# Patient Record
Sex: FEMALE | Race: BLACK OR AFRICAN AMERICAN | NOT HISPANIC OR LATINO | Employment: OTHER | URBAN - METROPOLITAN AREA
[De-identification: names, ages, dates, MRNs, and addresses within clinical notes are randomized per-mention and may not be internally consistent; named-entity substitution may affect disease eponyms.]

---

## 2023-04-05 ENCOUNTER — OFFICE VISIT (OUTPATIENT)
Dept: PRIMARY CARE | Facility: CLINIC | Age: 48
End: 2023-04-05
Payer: COMMERCIAL

## 2023-04-05 VITALS
HEIGHT: 67 IN | WEIGHT: 173 LBS | TEMPERATURE: 97.1 F | OXYGEN SATURATION: 98 % | SYSTOLIC BLOOD PRESSURE: 125 MMHG | HEART RATE: 77 BPM | BODY MASS INDEX: 27.15 KG/M2 | RESPIRATION RATE: 17 BRPM | DIASTOLIC BLOOD PRESSURE: 81 MMHG

## 2023-04-05 DIAGNOSIS — M46.1 SACROILIITIS (CMS-HCC): ICD-10-CM

## 2023-04-05 DIAGNOSIS — M25.551 HIP PAIN, ACUTE, RIGHT: Primary | ICD-10-CM

## 2023-04-05 PROBLEM — J45.20 MILD INTERMITTENT ASTHMA (HHS-HCC): Status: ACTIVE | Noted: 2023-04-05

## 2023-04-05 PROBLEM — N60.99 ATYPICAL DUCTAL HYPERPLASIA OF BREAST: Status: ACTIVE | Noted: 2022-04-01

## 2023-04-05 PROBLEM — E66.3 OVERWEIGHT (BMI 25.0-29.9): Status: ACTIVE | Noted: 2018-03-02

## 2023-04-05 PROBLEM — E66.3 OVERWEIGHT: Status: ACTIVE | Noted: 2023-04-05

## 2023-04-05 PROBLEM — N90.810: Status: ACTIVE | Noted: 2022-07-14

## 2023-04-05 PROBLEM — N97.9 FEMALE INFERTILITY: Status: ACTIVE | Noted: 2023-04-05

## 2023-04-05 PROBLEM — J45.909 ASTHMA (HHS-HCC): Status: ACTIVE | Noted: 2022-03-24

## 2023-04-05 PROBLEM — R76.8 ANA POSITIVE: Status: ACTIVE | Noted: 2019-08-20

## 2023-04-05 PROCEDURE — 99213 OFFICE O/P EST LOW 20 MIN: CPT | Performed by: FAMILY MEDICINE

## 2023-04-05 RX ORDER — IBUPROFEN 800 MG/1
800 TABLET ORAL EVERY 8 HOURS PRN
COMMUNITY
Start: 2023-03-14

## 2023-04-05 RX ORDER — CLOBETASOL PROPIONATE 0.5 MG/G
OINTMENT TOPICAL
COMMUNITY
Start: 2022-08-04 | End: 2023-04-05 | Stop reason: ALTCHOICE

## 2023-04-05 RX ORDER — HALOBETASOL PROPIONATE 0.5 MG/G
OINTMENT TOPICAL
COMMUNITY
Start: 2023-03-10

## 2023-04-05 RX ORDER — MULTIVITAMIN
TABLET ORAL
COMMUNITY
Start: 2022-02-28

## 2023-04-05 RX ORDER — ESTRADIOL 2 MG/1
3 TABLET ORAL DAILY
COMMUNITY
Start: 2022-07-22 | End: 2023-04-05 | Stop reason: ALTCHOICE

## 2023-04-05 ASSESSMENT — PAIN SCALES - GENERAL: PAINLEVEL: 4

## 2023-04-05 ASSESSMENT — ENCOUNTER SYMPTOMS
FREQUENCY: 0
LOSS OF SENSATION IN FEET: 0
OCCASIONAL FEELINGS OF UNSTEADINESS: 0
WEAKNESS: 0
NUMBNESS: 0
DEPRESSION: 0
FLANK PAIN: 0
DIFFICULTY URINATING: 0

## 2023-04-05 ASSESSMENT — COLUMBIA-SUICIDE SEVERITY RATING SCALE - C-SSRS
1. IN THE PAST MONTH, HAVE YOU WISHED YOU WERE DEAD OR WISHED YOU COULD GO TO SLEEP AND NOT WAKE UP?: NO
6. HAVE YOU EVER DONE ANYTHING, STARTED TO DO ANYTHING, OR PREPARED TO DO ANYTHING TO END YOUR LIFE?: NO
2. HAVE YOU ACTUALLY HAD ANY THOUGHTS OF KILLING YOURSELF?: NO

## 2023-04-05 ASSESSMENT — PATIENT HEALTH QUESTIONNAIRE - PHQ9
2. FEELING DOWN, DEPRESSED OR HOPELESS: NOT AT ALL
SUM OF ALL RESPONSES TO PHQ9 QUESTIONS 1 AND 2: 0
1. LITTLE INTEREST OR PLEASURE IN DOING THINGS: NOT AT ALL

## 2023-04-05 NOTE — PROGRESS NOTES
Subjective   Patient ID: Yahaira Murrell is a 48 y.o. female who presents for Hip Pain (Patient states that she has had right hip pain for about 5-6 days now. Patient states she think she may have been running too fast on the treadmill. She also states that she has these episodes on hip pain.).    Just got back to running on the treadmill. 3 d pain right posterior pelvis area and lateral hip. Was really bad 2 days ago. Took Ibuprofen for it for 2 days. No numbness or tingling. She is concerned because this happens recurrently. Wants to be sure nothing important is going on.     Had other concerns. Recently started with [facility] infertility. Has had vaginal itch and was given clobetasol cream. When she had this before it was yeast infection. This time, got monistat and internal itching cleared up partially. Went back to [facility] and they told her nothing to help her. They did cultures and everything came out clear. She had a little discharge when she had full-blown yeast. Would like to know who could see to evaluate it.            Current Outpatient Medications:     halobetasol (UltraVATE) 0.05 % ointment, APPLY TO AFFECTED AREA TWICE A DAY FOR 7 DAYS THEN TWICE WEEKLY, Disp: , Rfl:     ibuprofen 800 mg tablet, TAKE 1 TABLET BY MOUTH EVERY 8 HOURS FOR PAIN, Disp: , Rfl:     multivitamin tablet, Take by mouth., Disp: , Rfl:     Patient Active Problem List   Diagnosis    Female infertility    Mild intermittent asthma    Overweight (BMI 25.0-29.9)    Atypical ductal hyperplasia of breast    History of female genital mutilation    STAR positive    Asthma    Sacroiliitis (CMS/HCC)    Hip pain, acute, right         Review of Systems   Genitourinary:  Negative for difficulty urinating, flank pain and frequency.   Skin:  Negative for rash.   Neurological:  Negative for weakness and numbness.       Objective   /81 (BP Location: Left arm, Patient Position: Sitting, BP Cuff Size: Adult)   Pulse 77   Temp 36.2 °C (97.1 °F)   Resp  "17   Ht 1.702 m (5' 7\")   Wt 78.5 kg (173 lb)   SpO2 98%   BMI 27.10 kg/m²     Physical Exam  Constitutional:       Appearance: Normal appearance. She is normal weight.   Musculoskeletal:      Cervical back: Neck supple.      Comments: ROM back full. Tender right SI. Normal ROM hip. No tenderness hip. Neg SLR. Normal strength and ROM BLE.    Neurological:      Mental Status: She is alert.         Assessment/Plan   Problem List Items Addressed This Visit          Musculoskeletal    Sacroiliitis (CMS/HCC)     Pain right SI area, difficulty after prolnoged sitting and getting up, or after doing treadmill. Continue Ibuprofen for 7 more days. If not helpful, could consider steroid but she has issues with vaginal itching.   SI and hip xr  can be done if not better. She can go to see orthopedics for evaluation if desires. Feel she is good candidate for [facility] therapy. Will continue Ibuprofen for 7 days straight to limit inflammation.         Relevant Orders    XR sacroiliac joints 3+ views    Referral to Physical Therapy    Hip pain, acute, right - Primary     Recurrent, hard to walk after used treadmill. Will get Xray if not better. Will refer for [facility] therapy. . This is 7th episode in past 3 years.          Relevant Orders    XR hip right 2 or 3 views    Referral to Physical Therapy         Assessment, plans, tests, and follow up discussed with patient and patient verbalized understanding. Yahaira was given an opportunity to ask questions and  any concerns were addressed including but not limited to tratment options, indications for follow up and imaging..  "

## 2023-04-05 NOTE — ASSESSMENT & PLAN NOTE
Pain right SI area, difficulty after prolnoged sitting and getting up, or after doing treadmill. Continue Ibuprofen for 7 more days. If not helpful, could consider steroid but she has issues with vaginal itching.   SI and hip xr  can be done if not better. She can go to see orthopedics for evaluation if desires. Feel she is good candidate for Preeti therapy. Will continue Ibuprofen for 7 days straight to limit inflammation.   Statement Selected

## 2023-04-05 NOTE — PATIENT INSTRUCTIONS
You have referral to [facility]. Can be in either [place] or [place].     If not better, go ahead and get your xrays.     Continue Ibuprofen for 7 days.

## 2023-04-05 NOTE — ASSESSMENT & PLAN NOTE
Recurrent, hard to walk after used treadmill. Will get Xray if not better. Will refer for [facility]. . This is 7th episode in past 3 years.

## 2023-04-19 LAB
CLUE CELLS: NORMAL
NUGENT SCORE: 2
YEAST: NORMAL

## 2023-07-17 ENCOUNTER — OFFICE VISIT (OUTPATIENT)
Dept: PRIMARY CARE | Facility: CLINIC | Age: 48
End: 2023-07-17
Payer: COMMERCIAL

## 2023-07-17 VITALS
TEMPERATURE: 97.3 F | BODY MASS INDEX: 27 KG/M2 | HEART RATE: 78 BPM | WEIGHT: 172 LBS | OXYGEN SATURATION: 98 % | DIASTOLIC BLOOD PRESSURE: 71 MMHG | HEIGHT: 67 IN | SYSTOLIC BLOOD PRESSURE: 107 MMHG

## 2023-07-17 DIAGNOSIS — Z71.84 TRAVEL ADVICE ENCOUNTER: ICD-10-CM

## 2023-07-17 DIAGNOSIS — Z23 NEED FOR VACCINATION: Primary | ICD-10-CM

## 2023-07-17 PROCEDURE — 1036F TOBACCO NON-USER: CPT | Performed by: FAMILY MEDICINE

## 2023-07-17 PROCEDURE — 99213 OFFICE O/P EST LOW 20 MIN: CPT | Performed by: FAMILY MEDICINE

## 2023-07-17 RX ORDER — MEFLOQUINE HYDROCHLORIDE 250 MG/1
250 TABLET ORAL
Qty: 8 TABLET | Refills: 0 | Status: SHIPPED | OUTPATIENT
Start: 2023-07-17 | End: 2023-09-15

## 2023-07-17 RX ORDER — SALMONELLA TYPHI TY2 VI POLYSACCHARIDE ANTIGEN 25 UG/.5ML
0.5 INJECTION, SOLUTION INTRAMUSCULAR ONCE
Qty: 0.5 ML | Refills: 0 | Status: SHIPPED | OUTPATIENT
Start: 2023-07-17 | End: 2023-07-17

## 2023-07-17 NOTE — PROGRESS NOTES
"Subjective   Patient ID: Yahaira Murrell is a 48 y.o. female who presents for Med Refill.    She is traveling to [facility] on August 4. She has interest in getting malaria prophylaxis. She went to travel clinic and Typhoid price was very high.     She is going to [facility]. She will be staying in city the whole time. She has had yellow Fever Vaccien in the past. She needs to have Typhoid.     CDC recommends Tdap as diphtheria outbreak. She declines.            Current Outpatient Medications:     halobetasol (UltraVATE) 0.05 % ointment, APPLY TO AFFECTED AREA TWICE A DAY FOR 7 DAYS THEN TWICE WEEKLY, Disp: , Rfl:     ibuprofen 800 mg tablet, Take 1 tablet (800 mg) by mouth every 8 hours if needed for moderate pain (4 - 6)., Disp: , Rfl:     multivitamin tablet, Take by mouth., Disp: , Rfl:     mefloquine (Lariam) 250 mg tablet, Take 1 tablet (250 mg) by mouth 1 (one) time per week., Disp: 8 tablet, Rfl: 0    typhoid polysaccharide (Typhim VI) 25 mcg/0.5 mL injection, Inject 0.5 mL (25 mcg) into the shoulder, thigh, or buttocks 1 time for 1 dose., Disp: 0.5 mL, Rfl: 0    Patient Active Problem List   Diagnosis    Female infertility    Mild intermittent asthma    Overweight (BMI 25.0-29.9)    Atypical ductal hyperplasia of breast    History of female genital mutilation    STAR positive    Asthma    Sacroiliitis (CMS/HCC)    Hip pain, acute, right    Travel advice encounter         Review of Systems    Objective   /71   Pulse 78   Temp 36.3 °C (97.3 °F)   Ht 1.702 m (5' 7\")   Wt 78 kg (172 lb)   SpO2 98%   BMI 26.94 kg/m²     Physical Exam  Vitals reviewed.   Constitutional:       Appearance: Normal appearance.   Pulmonary:      Effort: Pulmonary effort is normal.   Neurological:      Mental Status: She is alert.   Psychiatric:         Mood and Affect: Mood normal.         Behavior: Behavior normal.         Assessment/Plan   Problem List Items Addressed This Visit       Travel advice encounter     Traveling to " [place]. Reviewed CDC website. Will get typhoid vaccine at pharmacy. Has had yellow fever vaccine and no booster needed. Recommend tdap as putbreak of Diphtheria currently. Mefloquine prophylaxis ordered and explained to patient. Protective measures discussed.           Other Visit Diagnoses       Need for vaccination    -  Primary    Relevant Medications    typhoid polysaccharide (Typhim VI) 25 mcg/0.5 mL injection    mefloquine (Lariam) 250 mg tablet              Assessment, plans, tests, and follow up discussed with patient and patient verbalized understanding. Yahaira was given an opportunity to ask questions and  any concerns were addressed including but not limited to prevention of infectious disease, water safety, CDC recommendations. .

## 2023-07-17 NOTE — ASSESSMENT & PLAN NOTE
Traveling to [facility]. Reviewed CDC website. Will get typhoid vaccine at pharmacy. Has had yellow fever vaccine and no booster needed. Recommend tdap as putbreak of Diphtheria currently. Mefloquine prophylaxis ordered and explained to patient. Protective measures discussed.

## 2023-07-17 NOTE — PATIENT INSTRUCTIONS
Recommend Tdap vaccine be updated to protect against Diphtheria.     You have prescription for Typhoid vaccine. Check with health department to see if you can get it there.     Mefloquine 250 mg one weekly, beginning at least 2 weeks before travel and for 4 full weeks afterward.     Protect yourself from mosqutoes the entire time you are there.

## 2024-01-29 ENCOUNTER — APPOINTMENT (OUTPATIENT)
Dept: PRIMARY CARE | Facility: CLINIC | Age: 49
End: 2024-01-29
Payer: COMMERCIAL

## 2024-04-15 ENCOUNTER — OFFICE VISIT (OUTPATIENT)
Dept: PRIMARY CARE | Facility: CLINIC | Age: 49
End: 2024-04-15
Payer: COMMERCIAL

## 2024-04-15 ENCOUNTER — LAB (OUTPATIENT)
Dept: LAB | Facility: LAB | Age: 49
End: 2024-04-15
Payer: COMMERCIAL

## 2024-04-15 VITALS
SYSTOLIC BLOOD PRESSURE: 120 MMHG | HEIGHT: 67 IN | HEART RATE: 68 BPM | WEIGHT: 172.8 LBS | RESPIRATION RATE: 16 BRPM | TEMPERATURE: 97.3 F | BODY MASS INDEX: 27.12 KG/M2 | DIASTOLIC BLOOD PRESSURE: 76 MMHG | OXYGEN SATURATION: 99 %

## 2024-04-15 DIAGNOSIS — Z11.59 ENCOUNTER FOR HEPATITIS C SCREENING TEST FOR LOW RISK PATIENT: ICD-10-CM

## 2024-04-15 DIAGNOSIS — N60.99 ATYPICAL DUCTAL HYPERPLASIA OF BREAST: ICD-10-CM

## 2024-04-15 DIAGNOSIS — Z13.1 SCREENING FOR DIABETES MELLITUS: ICD-10-CM

## 2024-04-15 DIAGNOSIS — Z12.11 SCREEN FOR COLON CANCER: ICD-10-CM

## 2024-04-15 DIAGNOSIS — Z13.220 SCREENING, LIPID: ICD-10-CM

## 2024-04-15 DIAGNOSIS — M46.1 SACROILIITIS (CMS-HCC): ICD-10-CM

## 2024-04-15 DIAGNOSIS — J45.20 MILD INTERMITTENT ASTHMA WITHOUT COMPLICATION (HHS-HCC): ICD-10-CM

## 2024-04-15 DIAGNOSIS — Z23 NEED FOR VACCINATION: ICD-10-CM

## 2024-04-15 DIAGNOSIS — Z00.00 WELL ADULT EXAM: Primary | ICD-10-CM

## 2024-04-15 PROBLEM — M25.551 HIP PAIN, ACUTE, RIGHT: Status: RESOLVED | Noted: 2023-04-05 | Resolved: 2024-04-15

## 2024-04-15 LAB
ALBUMIN SERPL BCP-MCNC: 4.5 G/DL (ref 3.4–5)
ALP SERPL-CCNC: 42 U/L (ref 33–110)
ALT SERPL W P-5'-P-CCNC: 13 U/L (ref 7–45)
ANION GAP SERPL CALC-SCNC: 10 MMOL/L (ref 10–20)
AST SERPL W P-5'-P-CCNC: 17 U/L (ref 9–39)
BILIRUB SERPL-MCNC: 0.7 MG/DL (ref 0–1.2)
BUN SERPL-MCNC: 13 MG/DL (ref 6–23)
CALCIUM SERPL-MCNC: 9.3 MG/DL (ref 8.6–10.3)
CHLORIDE SERPL-SCNC: 108 MMOL/L (ref 98–107)
CHOLEST SERPL-MCNC: 169 MG/DL (ref 0–199)
CHOLESTEROL/HDL RATIO: 3.2
CO2 SERPL-SCNC: 27 MMOL/L (ref 21–32)
CREAT SERPL-MCNC: 0.92 MG/DL (ref 0.5–1.05)
EGFRCR SERPLBLD CKD-EPI 2021: 77 ML/MIN/1.73M*2
ERYTHROCYTE [DISTWIDTH] IN BLOOD BY AUTOMATED COUNT: 13.1 % (ref 11.5–14.5)
GLUCOSE SERPL-MCNC: 82 MG/DL (ref 74–99)
HCT VFR BLD AUTO: 38.1 % (ref 36–46)
HCV AB SER QL: NONREACTIVE
HDLC SERPL-MCNC: 53 MG/DL
HGB BLD-MCNC: 12.3 G/DL (ref 12–16)
LDLC SERPL CALC-MCNC: 101 MG/DL
MCH RBC QN AUTO: 28.5 PG (ref 26–34)
MCHC RBC AUTO-ENTMCNC: 32.3 G/DL (ref 32–36)
MCV RBC AUTO: 88 FL (ref 80–100)
NON HDL CHOLESTEROL: 116 MG/DL (ref 0–149)
NRBC BLD-RTO: 0 /100 WBCS (ref 0–0)
PLATELET # BLD AUTO: 248 X10*3/UL (ref 150–450)
POTASSIUM SERPL-SCNC: 5.1 MMOL/L (ref 3.5–5.3)
PROT SERPL-MCNC: 7.3 G/DL (ref 6.4–8.2)
RBC # BLD AUTO: 4.32 X10*6/UL (ref 4–5.2)
SODIUM SERPL-SCNC: 140 MMOL/L (ref 136–145)
TRIGL SERPL-MCNC: 73 MG/DL (ref 0–149)
VLDL: 15 MG/DL (ref 0–40)
WBC # BLD AUTO: 2.9 X10*3/UL (ref 4.4–11.3)

## 2024-04-15 PROCEDURE — 99396 PREV VISIT EST AGE 40-64: CPT | Performed by: FAMILY MEDICINE

## 2024-04-15 PROCEDURE — 36415 COLL VENOUS BLD VENIPUNCTURE: CPT

## 2024-04-15 PROCEDURE — 86803 HEPATITIS C AB TEST: CPT

## 2024-04-15 PROCEDURE — 85027 COMPLETE CBC AUTOMATED: CPT

## 2024-04-15 PROCEDURE — 83036 HEMOGLOBIN GLYCOSYLATED A1C: CPT

## 2024-04-15 PROCEDURE — 80053 COMPREHEN METABOLIC PANEL: CPT

## 2024-04-15 PROCEDURE — 80061 LIPID PANEL: CPT

## 2024-04-15 RX ORDER — ESTRADIOL 0.1 MG/G
CREAM VAGINAL
COMMUNITY
Start: 2024-02-08 | End: 2024-04-15 | Stop reason: ALTCHOICE

## 2024-04-15 RX ORDER — ALBUTEROL SULFATE 90 UG/1
2 AEROSOL, METERED RESPIRATORY (INHALATION) EVERY 6 HOURS PRN
Qty: 18 G | Refills: 11 | Status: SHIPPED | OUTPATIENT
Start: 2024-04-15 | End: 2025-04-15

## 2024-04-15 RX ORDER — PROGESTERONE 200 MG/1
CAPSULE ORAL
COMMUNITY
Start: 2023-09-28

## 2024-04-15 RX ORDER — TAMOXIFEN CITRATE 20 MG/1
20 TABLET ORAL
COMMUNITY
Start: 2023-12-28 | End: 2024-04-15 | Stop reason: ALTCHOICE

## 2024-04-15 ASSESSMENT — ENCOUNTER SYMPTOMS
VOMITING: 0
DEPRESSION: 0
COUGH: 0
BLOOD IN STOOL: 0
APPETITE CHANGE: 0
DIARRHEA: 0
ARTHRALGIAS: 0
LOSS OF SENSATION IN FEET: 0
CONSTIPATION: 0
NERVOUS/ANXIOUS: 0
DIFFICULTY URINATING: 0
SHORTNESS OF BREATH: 0
POLYPHAGIA: 0
ACTIVITY CHANGE: 0
PALPITATIONS: 0
CONFUSION: 0
UNEXPECTED WEIGHT CHANGE: 0
TROUBLE SWALLOWING: 0
SEIZURES: 0
DYSPHORIC MOOD: 0
POLYDIPSIA: 0
NAUSEA: 0
JOINT SWELLING: 0
ABDOMINAL PAIN: 0
HEADACHES: 0
OCCASIONAL FEELINGS OF UNSTEADINESS: 0
WHEEZING: 0
FATIGUE: 0

## 2024-04-15 ASSESSMENT — COLUMBIA-SUICIDE SEVERITY RATING SCALE - C-SSRS
6. HAVE YOU EVER DONE ANYTHING, STARTED TO DO ANYTHING, OR PREPARED TO DO ANYTHING TO END YOUR LIFE?: NO
1. IN THE PAST MONTH, HAVE YOU WISHED YOU WERE DEAD OR WISHED YOU COULD GO TO SLEEP AND NOT WAKE UP?: NO
2. HAVE YOU ACTUALLY HAD ANY THOUGHTS OF KILLING YOURSELF?: NO

## 2024-04-15 ASSESSMENT — PATIENT HEALTH QUESTIONNAIRE - PHQ9
2. FEELING DOWN, DEPRESSED OR HOPELESS: NOT AT ALL
1. LITTLE INTEREST OR PLEASURE IN DOING THINGS: NOT AT ALL
SUM OF ALL RESPONSES TO PHQ9 QUESTIONS 1 AND 2: 0

## 2024-04-15 NOTE — PROGRESS NOTES
Subjective   Patient ID: Yahaira Murrell is a 49 y.o. female who presents for Annual Exam (Annual / Physical exam).    Here for physical and follow up.     Due for repeat lipids    Mild Intermittent asthma - rare use of inhaler. No shortness of breath with usual activities.     Being treated by breast surgery for Atypical Ductal Hyperplasia. On Tamoxifen but she was concerned about the side effects. She had surgery and is willing to just watch with MRI every 6 months.  Seen at [facility].     Here for annual wellness exam. Last wellness unknown.     New concerns:no  Feels: fairly well    Changes  to health/medications since last visit No   Other providers seen since last visit Surgery, GYN, wanted her to have screening for diabetes.   Periods: Becoming less regular  Contraception: Had salpingectomy in the past    Healthy lifestyle habits: Regular exercise: Yes, could improve                                        Dietary habits: lot of vegetables and grains                                        Social connections/relationships good                                        Wears seatbelts Yes                                         Sunscreen Yes                                         Sexual Risk Factors No        Health screenings:  Pap smear: July 2022 HPV negative repeat due July 2027                                  Mammogram Last October 2023. Frequency per breast surgery. Has followup                                  Self-breast Exam No                                   Colon screening no, no family history. Is willing to get Cologuard.                                   Dexa not applicable                                  Hep C screening no, will get test                                  HIV screening no                                  STI screeningnot applicable                          Health risks: Domestic Violence no                      Work-life balance Yes                       Smoke detectors Yes                        Carbon monoxide detectors yes                      Gun safety not applicable                      Second-hand Smoke No                       Occupational Risks no    Immunizations:  Influenza:  No                             Tdap: No, declines.                             HPV: not applicable                           Pneumonia: no                           Shingrix: not applicable                           COVID: times 1               Current Outpatient Medications:     ibuprofen 800 mg tablet, Take 1 tablet (800 mg) by mouth every 8 hours if needed for moderate pain (4 - 6)., Disp: , Rfl:     multivitamin tablet, Take by mouth., Disp: , Rfl:     progesterone (Prometrium) 200 mg capsule, TAKE 1 CAPSULE BY MOUTH DAILY FOR 10 DAYS AT BEDTIME EACH MONTH, Disp: , Rfl:     estradiol (Estrace) 0.01 % (0.1 mg/gram) vaginal cream, 1 GRAM VAGINALLY ONCE DAILY AT BEDTIME FOR 14 DAYS THEN REDUCE TO TWICE WEEKLY, Disp: , Rfl:     halobetasol (UltraVATE) 0.05 % ointment, APPLY TO AFFECTED AREA TWICE A DAY FOR 7 DAYS THEN TWICE WEEKLY, Disp: , Rfl:     tamoxifen (Nolvadex) 20 mg tablet, Take 1 tablet (20 mg total) by mouth once daily., Disp: , Rfl:     Patient Active Problem List   Diagnosis    Female infertility    Mild intermittent asthma ([facility]-HCC)    Overweight (BMI 25.0-29.9)    Atypical ductal hyperplasia of breast    History of female genital mutilation    STAR positive    Sacroiliitis (CMS-HCC)    Travel advice encounter         Review of Systems   Constitutional:  Negative for activity change, appetite change, fatigue and unexpected weight change.   HENT:  Negative for dental problem, hearing loss and trouble swallowing.    Eyes:  Negative for visual disturbance.   Respiratory:  Negative for cough, shortness of breath and wheezing.    Cardiovascular:  Negative for chest pain, palpitations and leg swelling.   Gastrointestinal:  Negative for abdominal pain, blood in stool, constipation, diarrhea, nausea and  "vomiting.   Endocrine: Negative for cold intolerance, heat intolerance, polydipsia, polyphagia and polyuria.   Genitourinary:  Negative for difficulty urinating and menstrual problem.   Musculoskeletal:  Negative for arthralgias and joint swelling.   Neurological:  Negative for seizures, syncope and headaches.   Psychiatric/Behavioral:  Negative for confusion and dysphoric mood. The patient is not nervous/anxious.        Objective   /76 (BP Location: Right arm, Patient Position: Sitting, BP Cuff Size: Adult long)   Pulse 68   Temp 36.3 °C (97.3 °F) (Temporal)   Resp 16   Ht 1.702 m (5' 7\")   Wt 78.4 kg (172 lb 12.8 oz)   SpO2 99%   BMI 27.06 kg/m²     Physical Exam  Constitutional:       Appearance: Normal appearance.   HENT:      Head: Normocephalic and atraumatic.      Right Ear: Tympanic membrane normal.      Left Ear: Tympanic membrane normal.      Nose: Nose normal.      Mouth/Throat:      Pharynx: Oropharynx is clear.   Eyes:      Extraocular Movements: Extraocular movements intact.      Conjunctiva/sclera: Conjunctivae normal.      Pupils: Pupils are equal, round, and reactive to light.   Neck:      Vascular: No carotid bruit.   Cardiovascular:      Rate and Rhythm: Normal rate and regular rhythm.      Pulses: Normal pulses.      Heart sounds: No murmur heard.  Pulmonary:      Effort: Pulmonary effort is normal.      Breath sounds: Normal breath sounds.   Abdominal:      Palpations: There is no hepatomegaly, splenomegaly or mass.      Tenderness: There is no abdominal tenderness.   Musculoskeletal:         General: Normal range of motion.      Cervical back: Normal range of motion.      Right lower leg: No edema.      Left lower leg: No edema.   Skin:     General: Skin is warm and dry.      Findings: No rash.   Neurological:      General: No focal deficit present.      Mental Status: She is alert. Mental status is at baseline.      Gait: Gait is intact.   Psychiatric:         Mood and Affect: " Mood normal.         Thought Content: Thought content normal.         Assessment/Plan   Problem List Items Addressed This Visit       Mild intermittent asthma (HHS-HCC)     Only Rare symptoms. No shortness of breath with daily activity. Recommend Pneumonia vaccine.          Atypical ductal hyperplasia of breast     Followed by Breast Surgery at [facility]. Repeat mammogram ordered by them and due this month. Is on Tamoxifen.          Sacroiliitis (CMS-HCC)     Symptoms have resolved.           Other Visit Diagnoses       Well adult exam    -  Primary    Discussed healthy lifestyle, Discussed screenings and prevention.    Encounter for hepatitis C screening test for low risk patient        offered    Need for vaccination        Due for Tdap, pneuminia, COVID booster.    Screening, lipid                  Assessment, plans, tests, and follow up discussed with patient and patient verbalized understanding. Yahaira was given an opportunity to ask questions and  any concerns were addressed including but not limited to testing, screening, immunizations due, follow up. .

## 2024-04-15 NOTE — ASSESSMENT & PLAN NOTE
Followed by Breast Surgery at [facility]. Repeat mammogram ordered by them and due this month. Is on Tamoxifen.

## 2024-04-16 LAB
EST. AVERAGE GLUCOSE BLD GHB EST-MCNC: 100 MG/DL
HBA1C MFR BLD: 5.1 %

## 2024-04-26 ENCOUNTER — HOSPITAL ENCOUNTER (OUTPATIENT)
Dept: RADIOLOGY | Facility: EXTERNAL LOCATION | Age: 49
Discharge: HOME | End: 2024-04-26

## 2024-04-26 ENCOUNTER — APPOINTMENT (OUTPATIENT)
Dept: PRIMARY CARE | Facility: CLINIC | Age: 49
End: 2024-04-26
Payer: COMMERCIAL

## 2024-04-26 DIAGNOSIS — M25.562 LEFT KNEE PAIN, UNSPECIFIED CHRONICITY: ICD-10-CM

## 2024-04-26 DIAGNOSIS — R29.898 LEFT ARM WEAKNESS: ICD-10-CM

## 2024-04-26 DIAGNOSIS — M79.672 LEFT FOOT PAIN: ICD-10-CM

## 2024-04-29 ENCOUNTER — LAB (OUTPATIENT)
Dept: LAB | Facility: LAB | Age: 49
End: 2024-04-29
Payer: COMMERCIAL

## 2024-04-29 ENCOUNTER — OFFICE VISIT (OUTPATIENT)
Dept: PRIMARY CARE | Facility: CLINIC | Age: 49
End: 2024-04-29
Payer: COMMERCIAL

## 2024-04-29 VITALS
WEIGHT: 175.6 LBS | DIASTOLIC BLOOD PRESSURE: 87 MMHG | TEMPERATURE: 98 F | HEIGHT: 67 IN | BODY MASS INDEX: 27.56 KG/M2 | RESPIRATION RATE: 16 BRPM | OXYGEN SATURATION: 98 % | HEART RATE: 84 BPM | SYSTOLIC BLOOD PRESSURE: 131 MMHG

## 2024-04-29 DIAGNOSIS — M79.602 LEFT ARM PAIN: Primary | ICD-10-CM

## 2024-04-29 DIAGNOSIS — R20.0 NUMBNESS OF LEFT FOOT: ICD-10-CM

## 2024-04-29 DIAGNOSIS — M25.562 KNEE PAIN, LEFT ANTERIOR: ICD-10-CM

## 2024-04-29 DIAGNOSIS — D72.819 CHRONIC LEUKOPENIA: ICD-10-CM

## 2024-04-29 PROBLEM — Z71.84 TRAVEL ADVICE ENCOUNTER: Status: RESOLVED | Noted: 2023-07-17 | Resolved: 2024-04-29

## 2024-04-29 LAB
BASOPHILS # BLD AUTO: 0.04 X10*3/UL (ref 0–0.1)
BASOPHILS NFR BLD AUTO: 1.1 %
EOSINOPHIL # BLD AUTO: 0.09 X10*3/UL (ref 0–0.7)
EOSINOPHIL NFR BLD AUTO: 2.5 %
ERYTHROCYTE [DISTWIDTH] IN BLOOD BY AUTOMATED COUNT: 13 % (ref 11.5–14.5)
HCT VFR BLD AUTO: 38.4 % (ref 36–46)
HGB BLD-MCNC: 11.8 G/DL (ref 12–16)
IMM GRANULOCYTES # BLD AUTO: 0 X10*3/UL (ref 0–0.7)
IMM GRANULOCYTES NFR BLD AUTO: 0 % (ref 0–0.9)
LYMPHOCYTES # BLD AUTO: 1.35 X10*3/UL (ref 1.2–4.8)
LYMPHOCYTES NFR BLD AUTO: 37.1 %
MCH RBC QN AUTO: 27.8 PG (ref 26–34)
MCHC RBC AUTO-ENTMCNC: 30.7 G/DL (ref 32–36)
MCV RBC AUTO: 90 FL (ref 80–100)
MONOCYTES # BLD AUTO: 0.3 X10*3/UL (ref 0.1–1)
MONOCYTES NFR BLD AUTO: 8.2 %
NEUTROPHILS # BLD AUTO: 1.86 X10*3/UL (ref 1.2–7.7)
NEUTROPHILS NFR BLD AUTO: 51.1 %
NRBC BLD-RTO: 0 /100 WBCS (ref 0–0)
PLATELET # BLD AUTO: 246 X10*3/UL (ref 150–450)
RBC # BLD AUTO: 4.25 X10*6/UL (ref 4–5.2)
WBC # BLD AUTO: 3.6 X10*3/UL (ref 4.4–11.3)

## 2024-04-29 PROCEDURE — 36415 COLL VENOUS BLD VENIPUNCTURE: CPT

## 2024-04-29 PROCEDURE — 1036F TOBACCO NON-USER: CPT | Performed by: FAMILY MEDICINE

## 2024-04-29 PROCEDURE — 85025 COMPLETE CBC W/AUTO DIFF WBC: CPT

## 2024-04-29 PROCEDURE — 99214 OFFICE O/P EST MOD 30 MIN: CPT | Performed by: FAMILY MEDICINE

## 2024-04-29 RX ORDER — PREDNISONE 20 MG/1
TABLET ORAL DAILY
Qty: 6 TABLET | Refills: 0 | Status: SHIPPED | OUTPATIENT
Start: 2024-04-29 | End: 2024-05-04

## 2024-04-29 ASSESSMENT — ENCOUNTER SYMPTOMS
FEVER: 0
DIAPHORESIS: 0
CHILLS: 0
UNEXPECTED WEIGHT CHANGE: 0

## 2024-04-29 ASSESSMENT — PAIN SCALES - GENERAL: PAINLEVEL: 6

## 2024-04-29 NOTE — ASSESSMENT & PLAN NOTE
Burning onto lateral dorsum of hand. Is normal strength, no injury. If steroids no help, XR C spine.

## 2024-04-29 NOTE — ASSESSMENT & PLAN NOTE
Subjective. Normal strength DTRs normal. XR foot negative. Steroid burst. If not better, may need spine xrays.

## 2024-04-29 NOTE — PATIENT INSTRUCTIONS
Take Prednisone 20 mg 2 today then 1 daily for 4 days. Do not take on Empty stomach.     If not improving in 3-5 days, get your neck and low back xrays done and let me know.     Get repeat CBC today with breakdown of the different cell lines.

## 2024-04-29 NOTE — PROGRESS NOTES
"Subjective   Patient ID: Yahaira Murrell is a 49 y.o. female who presents for Knee Pain (Knee pain started on 4/15/2024).    Issues with left knee.     Burning outer lower leg to top of foot with standing a long time. Had UC Xray and was fine. Gave her a brace (elastic patellar sleeve)    Burning top of foot and left outer ankle area - comes and goes.     Left knee no injury. Feels like left knee is swollen.  No redness or warmth. No back pain. She is feeling ok otherwise.     Had labs done after last visit on 4/15.            Current Outpatient Medications:     albuterol 90 mcg/actuation inhaler, Inhale 2 puffs every 6 hours if needed for wheezing., Disp: 18 g, Rfl: 11    multivitamin tablet, Take by mouth., Disp: , Rfl:     progesterone (Prometrium) 200 mg capsule, TAKE 1 CAPSULE BY MOUTH DAILY FOR 10 DAYS AT BEDTIME EACH MONTH, Disp: , Rfl:     halobetasol (UltraVATE) 0.05 % ointment, APPLY TO AFFECTED AREA TWICE A DAY FOR 7 DAYS THEN TWICE WEEKLY, Disp: , Rfl:     ibuprofen 800 mg tablet, Take 1 tablet (800 mg) by mouth every 8 hours if needed for moderate pain (4 - 6)., Disp: , Rfl:     predniSONE (Deltasone) 20 mg tablet, Take 2 tablets (40 mg) by mouth once daily for 1 day, THEN 1 tablet (20 mg) once daily for 4 days., Disp: 6 tablet, Rfl: 0    Patient Active Problem List   Diagnosis    Female infertility    Mild intermittent asthma ([facility]-HCC)    Overweight (BMI 25.0-29.9)    Atypical ductal hyperplasia of breast    History of female genital mutilation    STAR positive    Sacroiliitis (CMS-HCC)    Knee pain, left anterior    Numbness of left foot    Left arm pain    Chronic leukopenia         Review of Systems   Constitutional:  Negative for chills, diaphoresis, fever and unexpected weight change.   Skin:  Negative for rash.       Objective   /87 (BP Location: Right arm, Patient Position: Sitting, BP Cuff Size: Adult long)   Pulse 84   Temp 36.7 °C (98 °F) (Temporal)   Resp 16   Ht 1.702 m (5' 7\")   " Wt 79.7 kg (175 lb 9.6 oz)   SpO2 98%   BMI 27.50 kg/m²     Physical Exam  Vitals reviewed.   Constitutional:       Appearance: Normal appearance.   Cardiovascular:      Pulses:           Dorsalis pedis pulses are 2+ on the right side and 2+ on the left side.        Posterior tibial pulses are 2+ on the right side and 2+ on the left side.   Pulmonary:      Effort: Pulmonary effort is normal.   Musculoskeletal:      Lumbar back: Normal. No deformity, spasms or bony tenderness. Normal range of motion. No scoliosis.      Right foot: Normal range of motion. No deformity.      Left foot: Normal range of motion. No deformity.   Feet:      Right foot:      Skin integrity: Skin integrity normal.      Left foot:      Skin integrity: Skin integrity normal.      Comments: Mildly decreased sensation dorsum left foot laterally. Some puffiness top of foot, Otherwise normal. No redness or warmth.   Neurological:      Mental Status: She is alert.   Psychiatric:         Mood and Affect: Mood normal.         Behavior: Behavior normal.     Xrays from urgent care - report reviewed.   Normal left foot and knee.       Recent Results (from the past 1008 hour(s))   Hepatitis C Antibody    Collection Time: 04/15/24 12:48 PM   Result Value Ref Range    Hepatitis C AB Nonreactive Nonreactive   Lipid Panel    Collection Time: 04/15/24 12:48 PM   Result Value Ref Range    Cholesterol 169 0 - 199 mg/dL    HDL-Cholesterol 53.0 mg/dL    Cholesterol/HDL Ratio 3.2     LDL Calculated 101 (H) <=99 mg/dL    VLDL 15 0 - 40 mg/dL    Triglycerides 73 0 - 149 mg/dL    Non HDL Cholesterol 116 0 - 149 mg/dL   Hemoglobin A1C    Collection Time: 04/15/24 12:48 PM   Result Value Ref Range    Hemoglobin A1C 5.1 see below %    Estimated Average Glucose 100 Not Established mg/dL   Comprehensive Metabolic Panel    Collection Time: 04/15/24 12:48 PM   Result Value Ref Range    Glucose 82 74 - 99 mg/dL    Sodium 140 136 - 145 mmol/L    Potassium 5.1 3.5 - 5.3  mmol/L    Chloride 108 (H) 98 - 107 mmol/L    Bicarbonate 27 21 - 32 mmol/L    Anion Gap 10 10 - 20 mmol/L    Urea Nitrogen 13 6 - 23 mg/dL    Creatinine 0.92 0.50 - 1.05 mg/dL    eGFR 77 >60 mL/min/1.73m*2    Calcium 9.3 8.6 - 10.3 mg/dL    Albumin 4.5 3.4 - 5.0 g/dL    Alkaline Phosphatase 42 33 - 110 U/L    Total Protein 7.3 6.4 - 8.2 g/dL    AST 17 9 - 39 U/L    Bilirubin, Total 0.7 0.0 - 1.2 mg/dL    ALT 13 7 - 45 U/L   CBC    Collection Time: 04/15/24 12:48 PM   Result Value Ref Range    WBC 2.9 (L) 4.4 - 11.3 x10*3/uL    nRBC 0.0 0.0 - 0.0 /100 WBCs    RBC 4.32 4.00 - 5.20 x10*6/uL    Hemoglobin 12.3 12.0 - 16.0 g/dL    Hematocrit 38.1 36.0 - 46.0 %    MCV 88 80 - 100 fL    MCH 28.5 26.0 - 34.0 pg    MCHC 32.3 32.0 - 36.0 g/dL    RDW 13.1 11.5 - 14.5 %    Platelets 248 150 - 450 x10*3/uL     === 04/26/24 ===    XR URGENT CARE XRAY    - Impression -  1. No acute fracture or dislocation identified.    MACRO:  None.    Signed by: Jonathan Weber 4/26/2024 12:56 PM  Dictation workstation:   [id number]      Assessment/Plan   Problem List Items Addressed This Visit       Knee pain, left anterior     Some swelling in knee. ROM full. No instability or guarding. McMurrays negative. Some tenderness overlying patella. Xr states no degen changes, She was told is arthritis.          Relevant Medications    predniSONE (Deltasone) 20 mg tablet    Numbness of left foot     Subjective. Normal strength DTRs normal. XR foot negative. Steroid burst. If not better, may need spine xrays.          Relevant Medications    predniSONE (Deltasone) 20 mg tablet    Other Relevant Orders    XR lumbar spine complete 4+ views    Left arm pain - Primary     Burning onto lateral dorsum of hand. Is normal strength, no injury. If steroids no help, XR C spine.         Relevant Medications    predniSONE (Deltasone) 20 mg tablet    Other Relevant Orders    XR cervical spine complete 4-5 views    Chronic leukopenia     WBC run low 3s, now 2.9. Need  diff. Once see that, will decide proper course.          Relevant Orders    CBC and Auto Differential         Assessment, plans, tests, and follow up discussed with patient and patient verbalized understanding. Yahaira was given an opportunity to ask questions and  any concerns were addressed including but not limited to medication use and side effects, lab orderes. Results from labs on 4/15, .

## 2024-04-29 NOTE — ASSESSMENT & PLAN NOTE
Some swelling in knee. ROM full. No instability or guarding. McMurrays negative. Some tenderness overlying patella. Xr states no degen changes, She was told is arthritis.

## 2024-05-01 ENCOUNTER — TELEPHONE (OUTPATIENT)
Dept: PRIMARY CARE | Facility: CLINIC | Age: 49
End: 2024-05-01
Payer: COMMERCIAL

## 2024-05-01 DIAGNOSIS — D72.819 CHRONIC LEUKOPENIA: Primary | ICD-10-CM

## 2024-05-01 NOTE — TELEPHONE ENCOUNTER
Her CBC is showing her White count is low still but improved a little. I think is time she see the hematologist to figure out if is just her normal or if needs more evaluation.

## 2024-05-07 ENCOUNTER — OFFICE VISIT (OUTPATIENT)
Dept: ORTHOPEDIC SURGERY | Facility: CLINIC | Age: 49
End: 2024-05-07
Payer: COMMERCIAL

## 2024-05-07 VITALS — WEIGHT: 172 LBS | BODY MASS INDEX: 27 KG/M2 | HEIGHT: 67 IN

## 2024-05-07 DIAGNOSIS — M25.562 CHRONIC PAIN OF LEFT KNEE: ICD-10-CM

## 2024-05-07 DIAGNOSIS — G89.29 CHRONIC PAIN OF LEFT KNEE: ICD-10-CM

## 2024-05-07 DIAGNOSIS — M79.672 LEFT FOOT PAIN: ICD-10-CM

## 2024-05-07 DIAGNOSIS — M22.42 CHONDROMALACIA OF LEFT PATELLA: Primary | ICD-10-CM

## 2024-05-07 PROCEDURE — 99203 OFFICE O/P NEW LOW 30 MIN: CPT | Performed by: FAMILY MEDICINE

## 2024-05-07 PROCEDURE — 1036F TOBACCO NON-USER: CPT | Performed by: FAMILY MEDICINE

## 2024-05-07 NOTE — PROGRESS NOTES
"  History of Present Illness   Chief Complaint   Patient presents with    Left Knee - Pain     Nki  +pain x several weeks         The patient is 49 y.o. female  here with a complaint of left knee pain.  Patient says that over the last year or so she has had some mild, intermittent pain within her knee.  A few weeks ago she was at a conference for work, was on her feet for extended period of time, felt some worsening pain in her knee, also had some burning pain in her foot, these have been waxing and waning in severity over the past few weeks.  She did go to the urgent care, x-rays obtained which were unremarkable of her knee and foot.  She did see her PCP a few days after, started on some prednisone with minimal change in symptoms.  She says that the pain seems to be more notable with walking, activity, her foot pain is worse at the end of the day, describes pain as \"peppery\", warm and burning at times.  She denies any back pain.    Past Medical History:   Diagnosis Date    Hip pain, acute, right 04/05/2023    Mild intermittent asthma ([facility]) 03/24/2022    Travel advice encounter 07/17/2023       Medication Documentation Review Audit       Reviewed by Kourtney Arana MA (Medical Assistant) on 04/29/24 at 1320      Medication Order Taking? Sig Documenting Provider Last Dose Status   albuterol 90 mcg/actuation inhaler [id number] Yes Inhale 2 puffs every 6 hours if needed for wheezing. Karyn Vanessa MD Taking Active   halobetasol (UltraVATE) 0.05 % ointment [id number] No APPLY TO AFFECTED AREA TWICE A DAY FOR 7 DAYS THEN TWICE WEEKLY Historical Provider, MD Not Taking Active   ibuprofen 800 mg tablet [id number] No Take 1 tablet (800 mg) by mouth every 8 hours if needed for moderate pain (4 - 6). Historical Provider, MD Not Taking Active   multivitamin tablet [id number] Yes Take by mouth. Historical Provider, MD Taking Active   progesterone (Prometrium) 200 mg capsule [id number] Yes TAKE 1 CAPSULE BY MOUTH DAILY FOR 10 " DAYS AT BEDTIME EACH MONTH Historical Provider, MD Taking Active                    No Known Allergies    Social History     Socioeconomic History    Marital status:      Spouse name: Not on file    Number of children: Not on file    Years of education: Not on file    Highest education level: Not on file   Occupational History    Not on file   Tobacco Use    Smoking status: Never    Smokeless tobacco: Never   Vaping Use    Vaping status: Never Used   Substance and Sexual Activity    Alcohol use: Yes     Comment: rarely    Drug use: Never    Sexual activity: Yes     Partners: Male   Other Topics Concern    Not on file   Social History Narrative    Not on file     Social Determinants of Health     Financial Resource Strain: Not on file   Food Insecurity: Not on file   Transportation Needs: Not on file   Physical Activity: Not on file   Stress: Not on file   Social Connections: Not on file   Intimate Partner Violence: Not on file   Housing Stability: Not on file       Past Surgical History:   Procedure Laterality Date    OTHER SURGICAL HISTORY  2022    Uterine myomectomy    OTHER SURGICAL HISTORY  2022     section    OTHER SURGICAL HISTORY  2022    Salpingectomy          Review of Systems   GENERAL: Negative  GI: Negative  MUSCULOSKELETAL: See HPI  SKIN: Negative  NEURO:  Negative     Physical Exam:    General/Constitutional: well appearing, no distress, appears stated age  HEENT: sclera clear  Respiratory: non labored breathing  Vascular: No edema, swelling or tenderness, except as noted in detailed exam.  Integumentary: No impressive skin lesions present, except as noted in detailed exam.  Neurological:  Alert and oriented   Psychological:  Normal mood and affect.  Musculoskeletal: Normal, except as noted in detailed exam and in HPI.  Normal gait, unassisted    Left knee: Normal appearance, no swelling, no redness or warmth, no joint effusion.  No areas of tenderness to palpation at  the knee today, nontender at the joint lines, no bony tenderness.  She has good range of motion from 0 to greater than 120 degrees of flexion, there is no pain or weakness with strength testing, negative Adams, negative Lachman, negative posterior drawer    Left foot: Normal appearance, no swelling, no redness warmth, no areas of tenderness to palpation.  She has full range of motion at the ankle in all directions, there is no pain with strength testing.  Sensation intact to light touch, 2+ pedal pulses       Imaging x-rays of left knee and left foot from urgent care from 4/26/2024 dependently reviewed, no acute abnormalities, no degenerative changes present      Assessment   1. Chondromalacia of left patella        2. Chronic pain of left knee  Referral to Orthopaedic Surgery      3. Left foot pain          Left knee, left foot pain, etiology of symptoms somewhat uncertain, there is some potential for some patellofemoral pain of the left knee, relatively benign exam, foot exam is also benign, describing more neuropathic type pain in the foot.    Plan: Discussed differential diagnosis, further workup and treatment.  I did give her a home exercise rehabilitation program for her knee that she will begin.  She feels like foot pain is getting slightly better, will continue to monitor this, may need some formal PT for possible neuropathy, may also benefit from EMG if symptoms are ongoing.  She will follow-up as symptoms dictate.

## 2024-05-13 ENCOUNTER — TELEPHONE (OUTPATIENT)
Dept: PRIMARY CARE | Facility: CLINIC | Age: 49
End: 2024-05-13
Payer: COMMERCIAL

## 2024-05-13 NOTE — TELEPHONE ENCOUNTER
I cannot cancel orders that are placed in the system. They will quit after several attempts. Alternately, she can call scheduling and tell them she does not intend to schedule and she wants them to stop the phone calls from happening. Is there anything we can do at this end to help?

## 2024-05-13 NOTE — TELEPHONE ENCOUNTER
Patient is  requesting her order for a X-ray and hematology to be cancelled so she can stop receiving calls. She is not going forward with either.

## 2024-05-14 LAB — NONINV COLON CA DNA+OCC BLD SCRN STL QL: NEGATIVE

## 2024-09-03 ENCOUNTER — TELEPHONE (OUTPATIENT)
Dept: PRIMARY CARE | Facility: CLINIC | Age: 49
End: 2024-09-03
Payer: COMMERCIAL

## 2024-09-03 DIAGNOSIS — J45.20 MILD INTERMITTENT ASTHMA, UNSPECIFIED WHETHER COMPLICATED (HHS-HCC): Primary | ICD-10-CM

## 2024-09-03 NOTE — TELEPHONE ENCOUNTER
Patient is requesting a referral to an allergy and asthma center. She wanted to know if she should come in first to discuss everything with you, or if she could just be referred.

## 2024-09-03 NOTE — TELEPHONE ENCOUNTER
Patient stated that she noticed that she is using her albuterol inhaler more frequently and she would prefer that a specialist examine he.

## 2025-04-25 ENCOUNTER — APPOINTMENT (OUTPATIENT)
Dept: PRIMARY CARE | Facility: CLINIC | Age: 50
End: 2025-04-25
Payer: COMMERCIAL